# Patient Record
Sex: MALE | Race: WHITE | NOT HISPANIC OR LATINO | Employment: OTHER | URBAN - METROPOLITAN AREA
[De-identification: names, ages, dates, MRNs, and addresses within clinical notes are randomized per-mention and may not be internally consistent; named-entity substitution may affect disease eponyms.]

---

## 2022-02-23 ENCOUNTER — TELEPHONE (OUTPATIENT)
Dept: ALLERGY | Facility: CLINIC | Age: 67
End: 2022-02-23
Payer: MEDICAID

## 2022-02-23 NOTE — TELEPHONE ENCOUNTER
----- Message from Belkis Herbert sent at 2/23/2022  4:46 PM CST -----  Regarding: pt called  Name of Who is Calling: ANGELES LEYVA [[id number]]      What is the request in detail: pt is requesting a epipen refill , and would like an over the counter eye recommendation from the Dr . Please advise     ([facility]  [address], [facility] [address]  [phone])      Can the clinic reply by [facility]: No      What Number to Call Back if not in [facility]: [phone]

## 2022-02-23 NOTE — TELEPHONE ENCOUNTER
Please advise pt that he must be seen before we can prescribe anything. As far as recommendation for eye drop, he may use OTC Pataday one drop twice daily.

## 2022-02-23 NOTE — TELEPHONE ENCOUNTER
Instructions conveyed to pt. He states he will contact pcp for Epipen and if they will not write it for him, he will call back and schedule an appt.

## 2023-01-23 ENCOUNTER — TELEPHONE (OUTPATIENT)
Dept: ALLERGY | Facility: CLINIC | Age: 68
End: 2023-01-23
Payer: MEDICARE

## 2023-01-23 NOTE — TELEPHONE ENCOUNTER
----- Message from Marianne Wheeler sent at 1/23/2023 10:42 AM CST -----  Contact: Cole Barr is calling in regards to getting his appointment on 1/25 move to a different day . Please call him back a [phone]      Thanks  CF

## 2023-03-02 ENCOUNTER — TELEPHONE (OUTPATIENT)
Dept: ALLERGY | Facility: CLINIC | Age: 68
End: 2023-03-02
Payer: MEDICARE

## 2023-03-02 NOTE — TELEPHONE ENCOUNTER
Left message for pt to return our call regarding rescheduling appt on May 18th. Appt has been cancelled.

## 2023-09-14 ENCOUNTER — OFFICE VISIT (OUTPATIENT)
Dept: ALLERGY | Facility: CLINIC | Age: 68
End: 2023-09-14
Payer: MEDICARE

## 2023-09-14 VITALS
WEIGHT: 156.06 LBS | TEMPERATURE: 98 F | HEART RATE: 64 BPM | SYSTOLIC BLOOD PRESSURE: 175 MMHG | DIASTOLIC BLOOD PRESSURE: 91 MMHG

## 2023-09-14 DIAGNOSIS — G47.33 OSA (OBSTRUCTIVE SLEEP APNEA): ICD-10-CM

## 2023-09-14 DIAGNOSIS — H10.10 SEASONAL ALLERGIC CONJUNCTIVITIS: ICD-10-CM

## 2023-09-14 DIAGNOSIS — R09.81 NASAL CONGESTION: ICD-10-CM

## 2023-09-14 DIAGNOSIS — L29.9 GENERALIZED PRURITUS: ICD-10-CM

## 2023-09-14 DIAGNOSIS — J30.2 PERENNIAL ALLERGIC RHINITIS WITH SEASONAL VARIATION: Primary | ICD-10-CM

## 2023-09-14 DIAGNOSIS — Z86.69 HISTORY OF SLEEP APNEA: ICD-10-CM

## 2023-09-14 DIAGNOSIS — R53.83 MALAISE AND FATIGUE: ICD-10-CM

## 2023-09-14 DIAGNOSIS — R09.82 PND (POST-NASAL DRIP): ICD-10-CM

## 2023-09-14 DIAGNOSIS — J30.89 PERENNIAL ALLERGIC RHINITIS WITH SEASONAL VARIATION: Primary | ICD-10-CM

## 2023-09-14 DIAGNOSIS — R53.81 MALAISE AND FATIGUE: ICD-10-CM

## 2023-09-14 DIAGNOSIS — L29.9 ITCHING: ICD-10-CM

## 2023-09-14 DIAGNOSIS — R06.83 SNORING: ICD-10-CM

## 2023-09-14 PROCEDURE — 99213 OFFICE O/P EST LOW 20 MIN: CPT | Mod: PBBFAC | Performed by: SPECIALIST

## 2023-09-14 PROCEDURE — 1101F PR PT FALLS ASSESS DOC 0-1 FALLS W/OUT INJ PAST YR: ICD-10-PCS | Mod: CPTII,S$GLB,, | Performed by: SPECIALIST

## 2023-09-14 PROCEDURE — 99205 OFFICE O/P NEW HI 60 MIN: CPT | Mod: S$GLB,,, | Performed by: SPECIALIST

## 2023-09-14 PROCEDURE — 99999 PR PBB SHADOW E&M-EST. PATIENT-LVL III: CPT | Mod: PBBFAC,,, | Performed by: SPECIALIST

## 2023-09-14 PROCEDURE — 99205 PR OFFICE/OUTPT VISIT, NEW, LEVL V, 60-74 MIN: ICD-10-PCS | Mod: S$GLB,,, | Performed by: SPECIALIST

## 2023-09-14 PROCEDURE — 1126F PR PAIN SEVERITY QUANTIFIED, NO PAIN PRESENT: ICD-10-PCS | Mod: CPTII,S$GLB,, | Performed by: SPECIALIST

## 2023-09-14 PROCEDURE — 3288F PR FALLS RISK ASSESSMENT DOCUMENTED: ICD-10-PCS | Mod: CPTII,S$GLB,, | Performed by: SPECIALIST

## 2023-09-14 PROCEDURE — 99999 PR PBB SHADOW E&M-EST. PATIENT-LVL III: ICD-10-PCS | Mod: PBBFAC,,, | Performed by: SPECIALIST

## 2023-09-14 PROCEDURE — 3288F FALL RISK ASSESSMENT DOCD: CPT | Mod: CPTII,S$GLB,, | Performed by: SPECIALIST

## 2023-09-14 PROCEDURE — 3080F PR MOST RECENT DIASTOLIC BLOOD PRESSURE >= 90 MM HG: ICD-10-PCS | Mod: CPTII,S$GLB,, | Performed by: SPECIALIST

## 2023-09-14 PROCEDURE — 1126F AMNT PAIN NOTED NONE PRSNT: CPT | Mod: CPTII,S$GLB,, | Performed by: SPECIALIST

## 2023-09-14 PROCEDURE — 3077F PR MOST RECENT SYSTOLIC BLOOD PRESSURE >= 140 MM HG: ICD-10-PCS | Mod: CPTII,S$GLB,, | Performed by: SPECIALIST

## 2023-09-14 PROCEDURE — 1159F MED LIST DOCD IN RCRD: CPT | Mod: CPTII,S$GLB,, | Performed by: SPECIALIST

## 2023-09-14 PROCEDURE — 1159F PR MEDICATION LIST DOCUMENTED IN MEDICAL RECORD: ICD-10-PCS | Mod: CPTII,S$GLB,, | Performed by: SPECIALIST

## 2023-09-14 PROCEDURE — 3080F DIAST BP >= 90 MM HG: CPT | Mod: CPTII,S$GLB,, | Performed by: SPECIALIST

## 2023-09-14 PROCEDURE — 3077F SYST BP >= 140 MM HG: CPT | Mod: CPTII,S$GLB,, | Performed by: SPECIALIST

## 2023-09-14 PROCEDURE — 1101F PT FALLS ASSESS-DOCD LE1/YR: CPT | Mod: CPTII,S$GLB,, | Performed by: SPECIALIST

## 2023-09-14 RX ORDER — PANTOPRAZOLE SODIUM 40 MG/1
40 TABLET, DELAYED RELEASE ORAL DAILY
COMMUNITY

## 2023-09-14 RX ORDER — AMITRIPTYLINE HYDROCHLORIDE 10 MG/1
TABLET, FILM COATED ORAL
Qty: 100 TABLET | Refills: 6 | Status: SHIPPED | OUTPATIENT
Start: 2023-09-14

## 2023-09-14 RX ORDER — ALLOPURINOL 100 MG/1
100 TABLET ORAL DAILY
COMMUNITY

## 2023-09-14 NOTE — PROGRESS NOTES
"Subjective:       Patient ID: Cortney Daniel is a 67 y.o. male.    Chief Complaint:  Allergies (Very allergic pt. Used to be on shots at our old office. Does not wants to be on AIT anymore. Stays nasally congested. Does not sleep well. Had sleep apnea years ago. Never on CPAP. Snores terribly. Evaluation for this done approx 20 years or so ago./Denies any infections.) and Itching (Generalized itching x 3 months or so. Does not take antihistamines because of hyperactivity./)      HPI:  Follow up.   Ne complaint : In the last 3 months having generalized pruritis.     male 67 year old, was a patient at the [facility]-- WITH ALLERGIES TO NON POLLENS AND POLLENS BY PREVIOUS TESTING AT THE [facility]-- HE SUCCESSFULLY UNDERWENT AIT / SCIT  for sporadically between 2002  and 2005 and more regularly for 2 plus years-- 2013- 2015-- for cat , dog and pollens of weeds , grasses and trees.    He was allergy skin tested on multiple occasions-- 02- 19- 2002 and 05- 19- 1998.  He was extremely allergic to all pollens- all seasons-- weeds, grasses and tree pollens, molds, House Dust Mites and cat and dog allergens  He has 4 indoor cats.  No longer on SCIT. He is well versed with allergen control measures. Allergy re skin testing was not recommended. " All antihistamines- even sedating ones-- HYPES him up ". Hence a steroid nose spray will; be a good choice.    Has Xerophthalmia.    Has eczema of several years- in remission. His skin is dry. Has seasonal eye allergies.  Cortney had had dysphagia and symptoms of Eosinophilic Esophagitis-- now under control..    Has a history suggestive of allergy to Penicillin--may do skin testing with Pre Pen and Penicillin -G and orally challenge with Augmentin.  He reports that in the past Beniacr affected / injured his kidney.  Is AN [employer] YE[employer].  Has a family history of allergies.    Outpatient Medications Marked as Taking for the 9/14/23 encounter (Office Visit) with Jose Luis Poe MD "   Medication Sig Dispense Refill    allopurinoL (ZYLOPRIM) 100 MG tablet Take 100 mg by mouth once daily.      pantoprazole (PROTONIX) 40 MG tablet Take 40 mg by mouth once daily.          Pcn [penicillins] and Benicar [olmesartan]     History reviewed. No pertinent past medical history.    History reviewed. No pertinent family history.    Environmental History: Dust Mite Controls: Dust mite controls are already in place.     Review of Systems   Constitutional:  Positive for fatigue.   HENT:  Positive for congestion, postnasal drip and rhinorrhea.    Eyes: Negative.    Respiratory: Negative.     Cardiovascular: Negative.    Gastrointestinal: Negative.    Endocrine: Negative.    Genitourinary: Negative.    Musculoskeletal: Negative.    Skin: Negative.         GENERALIZED ITCH     Allergic/Immunologic: Positive for environmental allergies.   Neurological: Negative.    Hematological: Negative.    Psychiatric/Behavioral: Negative.       Objective:     Visit Vitals  BP (!) 175/91 (BP Location: Left arm, Patient Position: Sitting)   Pulse 64   Temp 98.1 °F (36.7 °C)   Wt 70.8 kg (156 lb 1.4 oz)       Physical Exam  Vitals and nursing note reviewed.   Constitutional:       Appearance: Normal appearance. He is normal weight.   HENT:      Head: Normocephalic and atraumatic.      Right Ear: Tympanic membrane, ear canal and external ear normal.      Left Ear: Tympanic membrane, ear canal and external ear normal.      Nose: Congestion and rhinorrhea present.      Mouth/Throat:      Mouth: Mucous membranes are moist.      Pharynx: Oropharynx is clear.   Eyes:      Extraocular Movements: Extraocular movements intact.      Conjunctiva/sclera: Conjunctivae normal.      Pupils: Pupils are equal, round, and reactive to light.   Cardiovascular:      Rate and Rhythm: Normal rate and regular rhythm.      Pulses: Normal pulses.      Heart sounds: Normal heart sounds.   Pulmonary:      Effort: Pulmonary effort is normal.      Breath  "sounds: Normal breath sounds.   Abdominal:      General: Abdomen is flat. Bowel sounds are normal.      Palpations: Abdomen is soft.   Musculoskeletal:         General: Normal range of motion.      Cervical back: Normal range of motion and neck supple.   Skin:     General: Skin is warm and dry.      Capillary Refill: Capillary refill takes less than 2 seconds.   Neurological:      General: No focal deficit present.      Mental Status: He is alert and oriented to person, place, and time. Mental status is at baseline.   Psychiatric:         Mood and Affect: Mood normal.         Behavior: Behavior normal.         Thought Content: Thought content normal.         Judgment: Judgment normal.       Assessment:      1. Perennial allergic rhinitis with seasonal variation    2. Snoring    3. History of sleep apnea    4. Itching    5. Generalized pruritus    6. Nasal congestion    7. Seasonal allergic conjunctivitis    8. BRADY (obstructive sleep apnea)    9. PND (post-nasal drip)    10. Malaise and fatigue    11    All antihistamines hypes him up.     12    Ex cigarette smoker-- Smoked 20 cigarettes per day for 20 years- Quit smoking 33 years ago  13    Eczema- in remission    Plan:     Has 4 indoor cats- always had.  Lukewarm showers or bath.  Coconut oil before and after showers-- and Cere Ve or Aquaphor - bid/ tid.  Start on Elavil 10- 50 mg at 6 PM. Start with 10 mg at 6 PM- advance dose at 10 mg increments q 7 days, as toleraterd, as needed.  " USING HUMIDIFIER ".  Partaday -- one drop bid-- and Refresh Tears-- 2 drops tid prn.  Vanicream or Cere VE bid.  SLEEP CLINIC CONSULT.-- Refer to Nico Bajwa MD- will need to do another polysomnogram and get back on CPAP- IF NEEDED,.  Annual influenza vaccinations  For allergies-- may apply Flonase 50 mcg- -- 2 puffs bid-- since Allegra, Zyrtec and Claritin hyped him up in the past.  Follow up in 6 months                  Problems Address                                        "          Amount and/or Complexity                                                                      Risk       3           [] 2 or more self-limited or minor problems                      [] Limited                                                                        [] Low                  [] 1 stable chronic illness                                                  Any combination of the two                                               OTC drugs                  []Acute, uncomplicated illness or injury                            Review of prior external notes from unique source           Minor surgery with no risk factors                                                                                                               [] 1 []2  []3+                                                                                                              Review of results from each unique test                                                                                                               [] 1 []2  [] 3+                                                                                                              Order of each unique test                                                                                                               [] 1 []2  [] 3+                                                                                                              Or                                                                                                             [] Assessment requiring an independent historian      4            [] One or more chronic illness with exacerbation,              [] Moderate                                                                      [] Moderate                 Progression, or side effects of treatment                            -test documents or independent historians                        Prescription drug management                []  2 or more  sable chronic illnesses                                    [] Independent interpretation of tests                              Minor surgery with identifiable risk                [] 1 undiagnosed new problem with uncertain prognosis    [] Discussion or management of test results                    elective major surgery                [] 1 acute illness with                systemic symptoms                                                                                                                                                              [] 1 acute complicated injury                                                                                                                                          Elective major surgery                                                                                                                                                                                                                                                                                                                                                                                                  5            [] 1 or more chronic illnesses with severe exacerbation,     [] Extensive(two from below)                                         [] High                                                                                                               [] Independent interpretation of results                         Drug therapy requiring intensive                                                                                                               []Discussion of management or test interpretation           monitoring                                                                                                                                                                                                       Decision to de-escalate care                 [] 1 acute or chronic illness or injury  that poses a threat                                                                                               Decision regarding hospitalization

## 2024-01-04 NOTE — PROGRESS NOTES
Pulmonary Outpatient  Visit     Subjective:       Patient ID: Cortney Daniel is a 68 y.o. male.    Social History     Tobacco Use   Smoking Status Never   Smokeless Tobacco Never            Chief Complaint: Apnea      Cortnye Daniel is 68 y.o.  Referred by Jose Luis Poe MD  [address] [facility] [place] [address]   Concern for BRADY  Had PSG many years ago was +ve for BRADY  Never followup  Snoring, cannot share bedroom with spouse  Apnea,   Difficulty falling asleep  Never smoker  Declined RSV, Flu, PCV  20  Had coloscopy at [facility]  Colon cancer Mother  and 3 uncles  Retired worked for school board  Bed time 9 pm, wake time 6 am  SOL 30 minutes    Legs feel odd and restless  No signs supporting narcolepsy  Move violently during sleep  Naps for 30 mins  Endorsed loud snoring, nocturnal diaphoresis, apnea, dry mouth              Review of Systems   Constitutional:  Positive for fatigue.   Respiratory:  Positive for apnea and snoring.    Psychiatric/Behavioral:  Positive for sleep disturbance.    All other systems reviewed and are negative.      Outpatient Encounter Medications as of 1/5/2024   Medication Sig Dispense Refill    allopurinoL (ZYLOPRIM) 100 MG tablet Take 100 mg by mouth once daily.      amitriptyline (ELAVIL) 10 MG tablet Take 2 to 5 tablets at bedtime (titrate the dose) 100 tablet 6    pantoprazole (PROTONIX) 40 MG tablet Take 40 mg by mouth once daily.       No facility-administered encounter medications on file as of 1/5/2024.       The following portions of the patient's history were reviewed and updated as appropriate: He  has no past medical history on file.  He does not have any pertinent problems on file.  He  has no past surgical history on file.  His family history is not on file.  He  reports that he has never smoked. He has never used smokeless tobacco. No history on file for alcohol use and drug  use.  He has a current medication list which includes the following prescription(s): allopurinol, amitriptyline, and pantoprazole.  Current Outpatient Medications on File Prior to Visit   Medication Sig Dispense Refill    allopurinoL (ZYLOPRIM) 100 MG tablet Take 100 mg by mouth once daily.      amitriptyline (ELAVIL) 10 MG tablet Take 2 to 5 tablets at bedtime (titrate the dose) 100 tablet 6    pantoprazole (PROTONIX) 40 MG tablet Take 40 mg by mouth once daily.       No current facility-administered medications on file prior to visit.     He is allergic to pcn [penicillins] and benicar [olmesartan]..      BP Readings from Last 3 Encounters:   01/05/24 130/80   09/14/23 (!) 175/91     Snoring / Sleep:     [place] Questionnaire (validated BRADY screening questionnaire)    YES -- Snoring/apnea    YES -- Fatigue    Body mass index is 25.24 kg/m².  (>25 is overweight, >30 is obese)    Blood Pressure = normal blood pressure  (PreHTN 120-139/80-89, Stg1 140-159/90-99, Stg2 >160/>100)  [place] = 2 of three BRADY categories are positive (high risk is 2-3 positive categories)     [place] Sleepiness Scale TOTAL =          1/5/2024     9:16 AM   EPWORTH SLEEPINESS SCALE   Sitting and reading 0   Watching TV 0   Sitting, inactive in a public place (e.g. a theatre or a meeting) 0   As a passenger in a car for an hour without a break 0   Lying down to rest in the afternoon when circumstances permit 0   Sitting and talking to someone 0   Sitting quietly after a lunch without alcohol 0   In a car, while stopped for a few minutes in traffic 0   Total score 0      (validated sleepiness questionnaire with a higher score indicating greater sleepiness; range 0-24)      STOP-Bang Questionnaire (validated BRADY screening questionnaire)  Negative unless checked off.  [x] Snoring    [x]  Tired/Fatigued/Sleepy  [x] Obstruction (apneas/choking)  [] Pressure (HTN)  [] BMI >35  [x] Age >50  [] Neck >40 cm  [x] Gender male   STOP-Bang = 5 (low risk  "0-2,high risk 3-8)    Neck circumference 14"       MMRC Dyspnea Scale (4 is worst)     [] MMRC 0: Dyspneic on strenuous excercise (0 points)    [] MMRC 1: Dyspneic on walking a slight hill (0 points)    [] MMRC 2: Dyspneic on walking level ground; must stop occasionally due to breathlessness (1 point)    [] MMRC 3: Must stop for breathlessness after walking 100 yards or after a few minutes (2 points)    [] MMRC 4: Cannot leave house; breathless on dressing/undressing (3 points)                          No data to display                          Objective:     Vital Signs (Most Recent)  Vital Signs  Pulse: 75  Resp: 17  SpO2: 96 %  BP: 130/80  Height and Weight  Height: 5' 7" (170.2 cm)  Weight: 73.1 kg (161 lb 2.5 oz)  BSA (Calculated - sq m): 1.86 sq meters  BMI (Calculated): 25.2  Weight in (lb) to have BMI = 25: 159.3]  Wt Readings from Last 2 Encounters:   01/05/24 73.1 kg (161 lb 2.5 oz)   09/14/23 70.8 kg (156 lb 1.4 oz)       Physical Exam  Vitals and nursing note reviewed.   Constitutional:       Appearance: Normal appearance.   HENT:      Head: Normocephalic and atraumatic.      Right Ear: Tympanic membrane normal.      Nose: Nose normal.   Eyes:      Pupils: Pupils are equal, round, and reactive to light.   Cardiovascular:      Rate and Rhythm: Normal rate and regular rhythm.      Pulses: Normal pulses.      Heart sounds: Normal heart sounds.   Pulmonary:      Effort: Pulmonary effort is normal.      Breath sounds: Normal breath sounds.   Abdominal:      General: Bowel sounds are normal.      Palpations: Abdomen is soft.   Musculoskeletal:         General: Normal range of motion.      Cervical back: Normal range of motion.   Skin:     General: Skin is warm and dry.      Capillary Refill: Capillary refill takes less than 2 seconds.   Neurological:      General: No focal deficit present.      Mental Status: He is alert and oriented to person, place, and time.   Psychiatric:         Mood and Affect: Mood " "normal.          Laboratory  No results found for: "WBC", "RBC", "HGB", "HCT", "MCV", "MCH", "MCHC", "RDW", "PLT", "MPV", "GRAN", "LYMPH", "MONO", "EOS", "BASO", "EOSINOPHIL", "BASOPHIL"    BMP  Lab Results   Component Value Date     03/25/2021    K 4.2 03/25/2021     03/25/2021    CO2 24 03/25/2021    BUN 14 03/25/2021    CREATININE 0.82 03/25/2021    CALCIUM 9.4 03/25/2021    ANIONGAP 12 03/25/2021    ESTGFRAFRICA 114 03/25/2021          No results found for: "IGE"     No results found for: "ASPERGILLUS"  No results found for: "AFUMIGATUSCL"     No results found for: "ACE"     Diagnostic Results:  I have personally reviewed today the following studies:    No image results found.       Assessment/Plan:     Problem List Items Addressed This Visit       Snoring    History of sleep apnea    Perennial allergic rhinitis with seasonal variation    BRADY (obstructive sleep apnea) - Primary     Will order CPAP after PSG         Relevant Orders    Polysomnogram (CPAP will be added if patient meets diagnostic criteria.)    X-Ray Chest PA And Lateral             Follow up in about 6 weeks (around 2/16/2024), or PSG, CXR. declined vacinations.    This note was prepared using voice recognition system and is likely to have sound alike errors that may have been overlooked even after proof reading.  Please call me with any questions    Discussed diagnosis, its evaluation, treatment and usual course. All questions answered.    Thank you for the courtesy of participating in the care of this patient    Nico Bajwa MD      Personal Diagnostic Review  []  CXR    []  ECHO    []  ONSAT    []  6MWD    []  LABS    []  CHEST CT    []  PET CT    []  Biopsy results           "

## 2024-01-05 ENCOUNTER — HOSPITAL ENCOUNTER (OUTPATIENT)
Dept: RADIOLOGY | Facility: HOSPITAL | Age: 69
Discharge: HOME | End: 2024-01-05
Attending: INTERNAL MEDICINE
Payer: MEDICARE

## 2024-01-05 ENCOUNTER — OFFICE VISIT (OUTPATIENT)
Dept: PULMONOLOGY | Facility: CLINIC | Age: 69
End: 2024-01-05
Payer: MEDICARE

## 2024-01-05 VITALS
SYSTOLIC BLOOD PRESSURE: 130 MMHG | WEIGHT: 161.19 LBS | RESPIRATION RATE: 17 BRPM | HEIGHT: 67 IN | BODY MASS INDEX: 25.3 KG/M2 | HEART RATE: 75 BPM | DIASTOLIC BLOOD PRESSURE: 80 MMHG | OXYGEN SATURATION: 96 %

## 2024-01-05 DIAGNOSIS — J30.89 PERENNIAL ALLERGIC RHINITIS WITH SEASONAL VARIATION: ICD-10-CM

## 2024-01-05 DIAGNOSIS — G47.33 OSA (OBSTRUCTIVE SLEEP APNEA): ICD-10-CM

## 2024-01-05 DIAGNOSIS — Z86.69 HISTORY OF SLEEP APNEA: ICD-10-CM

## 2024-01-05 DIAGNOSIS — R06.83 SNORING: ICD-10-CM

## 2024-01-05 DIAGNOSIS — J30.2 PERENNIAL ALLERGIC RHINITIS WITH SEASONAL VARIATION: ICD-10-CM

## 2024-01-05 DIAGNOSIS — G47.33 OSA (OBSTRUCTIVE SLEEP APNEA): Primary | ICD-10-CM

## 2024-01-05 PROCEDURE — 99999 PR PBB SHADOW E&M-EST. PATIENT-LVL V: CPT | Mod: PBBFAC,,, | Performed by: INTERNAL MEDICINE

## 2024-01-05 PROCEDURE — 71046 X-RAY EXAM CHEST 2 VIEWS: CPT | Mod: TC

## 2024-01-05 PROCEDURE — 99204 OFFICE O/P NEW MOD 45 MIN: CPT | Mod: S$GLB,,, | Performed by: INTERNAL MEDICINE

## 2024-01-05 PROCEDURE — 71046 X-RAY EXAM CHEST 2 VIEWS: CPT | Mod: 26,,, | Performed by: RADIOLOGY

## 2024-01-05 NOTE — PATIENT INSTRUCTIONS
Your provider has scheduled you for a sleep study.   You should be receiving a phone call from the sleep lab shortly after your study has been approved by your insurance. Please make sure you have your current phone numbers in the [facility] system.     If you do not hear from anyone in the next 10 business days, please call the sleep lab at [phone] to schedule your sleep study.     The sleep studies are performed at [facility] seven nights a week.        After completion of sleep study, the pulmonary /Sleep navigator coordinator  will  make you a follow up appointment with your sleep provider after they have reviewed the results     This follow up appointment will be 10-14 days after your sleep study to review the results. If it is noted that you do have sleep apnea on your initial sleep study, you may receive a call back for a second night study with the CPAP before you come back to the office.     Followup appointments for results review would be best done VIRTUAL however inperson can also be done

## 2024-01-05 NOTE — LETTER
January 5, 2024        Chalino Lei MD  [address]  [facility] [address]             The [address]  [address]  [place] [address]  Phone: [phone]  Fax: [phone]   Patient: Cortney Daniel   MR Number: [id number]   YOB: 1955   Date of Visit: 1/5/2024       Dear Dr. Lei:    Thank you for referring Cortney Daniel to me for evaluation. Attached you will find relevant portions of my assessment and plan of care.    If you have questions, please do not hesitate to call me. I look forward to following Cortney Daniel along with you.    Sincerely,      Nico Bajwa MD            CC    No Recipients    Enclosure

## 2024-01-14 ENCOUNTER — HOSPITAL ENCOUNTER (OUTPATIENT)
Dept: SLEEP MEDICINE | Facility: HOSPITAL | Age: 69
Discharge: HOME | End: 2024-01-14
Attending: INTERNAL MEDICINE
Payer: MEDICARE

## 2024-01-14 DIAGNOSIS — R06.83 PRIMARY SNORING: ICD-10-CM

## 2024-01-14 DIAGNOSIS — F51.02 ADJUSTMENT INSOMNIA: ICD-10-CM

## 2024-01-14 DIAGNOSIS — G47.61 PLMD (PERIODIC LIMB MOVEMENT DISORDER): Primary | ICD-10-CM

## 2024-01-14 DIAGNOSIS — G47.33 OSA (OBSTRUCTIVE SLEEP APNEA): ICD-10-CM

## 2024-01-14 PROCEDURE — 95810 POLYSOM 6/> YRS 4/> PARAM: CPT

## 2024-01-14 PROCEDURE — 95810 POLYSOM 6/> YRS 4/> PARAM: CPT | Mod: 26,,, | Performed by: INTERNAL MEDICINE

## 2024-01-26 NOTE — PROCEDURES
"Patient Name: Cortney Daniel Study Date: 1/14/2024   YOB: 1955 Study Type: PSG   Age: 68 year Patient #: [id number]   Sex: Male Billing ID: -   Height: 5' 7" Interpreting Physician: Nico Bajwa MD   Weight: 161.0 lbs Recording Tech: Timbo Leong   BMI: 25.3 Scoring Tech: Angelitagianfranco Pinzon   Draper: 0 Neck Circumference: 14     Indications for Polysomnography  The patient is a 68 year-old Male who is 5' 7" and weighs 161.0 lbs. His BMI equals 25.3.  A full night polysomnogram was performed to evaluate for -.BRADY    Referring Provider Nico Bajwa MD    Medical History: Concern for BRADY  Had PSG many years ago was +ve for BRADY  Never follow-up  Snoring, cannot share bedroom with spouse  Apnea,   Difficulty falling asleep  Bed time 9 pm, wake time 6 am  SOL 30 minutes     Legs feel odd and restless  No signs supporting narcolepsy  Move violently during sleep  Naps for 30 mins  Endorsed loud snoring, nocturnal diaphoresis, apnea, dry mouth      Medication   allopurinoL (ZYLOPRIM) 100 MG tablet    amitriptyline (ELAVIL) 10 MG tablet    pantoprazole (PROTONIX) 40 MG tablet      Test Description  Patient was connected to a full set of leads with a sleep technician in attendance.  Parameters used were EEG derivations (F4-A1, C4-A1, O2-A1), EOG derivations (LOC-A2, DRE-A2), EKG, EMG - extremity (leg) & chin, oxygen saturation, effort parameters + abdominal/thoracic (RIP), and airflow parameters - nasal pressure/thermal.  Body position was visually monitored and noted.  Audio & video monitoring was performed during study.    Scoring is done in accordance with the American Academy of Sleep Medicine Manual for the Scoring of Sleep and Associated Events version 2.6.  Hypopneas are scored using the 4% desaturation rule.    Sleep Architecture  The total recording time of the polysomnogram was 484.1 minutes.  The total sleep time was 288.0 minutes.  The patient spent 17.7% of total sleep time in Stage N1, 55.4% " in Stage N2, 6.9% in Stages N3, and 20.0% in REM.  Sleep latency was 121.6 minutes.  REM latency was 55.5 minutes.  Sleep Efficiency was 59.5%.  Wake after Sleep Onset time was 74.5 minutes.    Respiratory Events  The polysomnogram revealed a presence of 4 obstructive, - central, and 1 mixed apneas resulting in an Apnea index of 1.0 events per hour.  There were 5 hypopneas (?3% desat and/or Ar.) resulting in an Apnea\Hypopnea Index (AHI ?3% desat and/or Ar.) of 2.1 events per hour.  There were 4 hypopneas (?4% desat) resulting in an Apnea\Hypopnea Index (AHI ?4% desat) of 1.9 events per hour.  There were - Respiratory Effort Related Arousals resulting in a RERA index of - events per hour. The Respiratory Disturbance Index is 2.1 events per hour.     Mean oxygen saturation was 93.4%.  The lowest oxygen saturation during sleep was 87.0%.  Time spent ?88% oxygen saturation was 1.3 minutes (0.3%).    Limb Activity  There were 210 total limb movements recorded, of this total, 209 were classified as PLMs.  PLM index was 43.5 per hour and PLM associated with Arousals index was 9.4 per hour.    Cardiac Summary  The average pulse rate was 63.6 bpm.  The minimum pulse rate was 52.0 bpm while the maximum pulse rate was 95.0 bpm.  Cardiac rhythm was normal/PVC    Behavioralobservation  awake 21:01 hrs to 22:58 hrs, 23:21 hrs to 23:34 hrs      Conclusion:   PLM index: 43.5/hr with PLMAI 9.4/hr  Arousal index was High: 27.3/hr  Overall AHI 2.1/hr with 10 events.  Moderate snoring  Poor sleep efficiency: 59.5%    Diagnosis: Periodic Limb Movement Disorder / 327.51   Primary Snoring   Adjustment insomnia    Recommendations:     ENT evaluation for snoring  Sleep diary and insomnia therapy  Clinical correlation for restless legs      Nico Woo MD  [address] [facility]  [place] [id number]/Chalino Lei MD         The sleep study that you ordered is complete.  You have ordered sleep LAB services to perform the  "sleep study for Cortney Daniel .      Please find Sleep Study result in  the "Media tab" of Chart Review menu.        You can look  for the report in the  Media by the document type "Sleep Study Documents". Alphabetizing  "Document type" column helps to find the SLEEP STUDY report  Faster.       As the ordering provider, you are responsible for reviewing the results and implementing a treatment plan with your patient.    If you need a Sleep Medicine provider to explain the sleep study findings and arrange treatment for the patient, please refer patient for consultation to our Sleep Clinic via [facility] with Ambulatory Consult Sleep.     To do that please place an order for an  "Ambulatory Consult Sleep" -  order , it will go to our clinic work queue for our staff  to contact the patient for an appointment.      For any questions, please contact our sleep lab  staff at [phone] to talk to clinical staff          Nico Bajwa MD    "

## 2024-02-15 ENCOUNTER — OFFICE VISIT (OUTPATIENT)
Dept: SLEEP MEDICINE | Facility: CLINIC | Age: 69
End: 2024-02-15
Payer: MEDICARE

## 2024-02-15 VITALS
BODY MASS INDEX: 25.12 KG/M2 | RESPIRATION RATE: 18 BRPM | HEIGHT: 67 IN | DIASTOLIC BLOOD PRESSURE: 80 MMHG | SYSTOLIC BLOOD PRESSURE: 136 MMHG | OXYGEN SATURATION: 97 % | WEIGHT: 160.06 LBS | HEART RATE: 80 BPM

## 2024-02-15 DIAGNOSIS — G47.61 PLMD (PERIODIC LIMB MOVEMENT DISORDER): Primary | ICD-10-CM

## 2024-02-15 DIAGNOSIS — E78.2 MIXED HYPERLIPIDEMIA: ICD-10-CM

## 2024-02-15 DIAGNOSIS — R06.83 SNORING: ICD-10-CM

## 2024-02-15 PROBLEM — G47.33 OSA (OBSTRUCTIVE SLEEP APNEA): Status: RESOLVED | Noted: 2023-09-14 | Resolved: 2024-02-15

## 2024-02-15 PROBLEM — E78.5 HYPERLIPIDEMIA: Status: ACTIVE | Noted: 2024-02-15

## 2024-02-15 PROCEDURE — 99999 PR PBB SHADOW E&M-EST. PATIENT-LVL IV: CPT | Mod: PBBFAC,,, | Performed by: INTERNAL MEDICINE

## 2024-02-15 PROCEDURE — 99214 OFFICE O/P EST MOD 30 MIN: CPT | Mod: S$GLB,,, | Performed by: INTERNAL MEDICINE

## 2024-02-15 RX ORDER — PRAMIPEXOLE DIHYDROCHLORIDE 0.12 MG/1
0.25 TABLET ORAL NIGHTLY
Qty: 60 TABLET | Refills: 1 | Status: SHIPPED | OUTPATIENT
Start: 2024-02-15 | End: 2024-04-15

## 2024-02-15 NOTE — PROGRESS NOTES
Pulmonary Outpatient  Visit     Subjective:       Patient ID: Cortney Daniel is a 68 y.o. male.    Social History     Tobacco Use   Smoking Status Never   Smokeless Tobacco Never            Chief Complaint: Results      Cortney Daniel is 68 y.o.  Referred by Jose Luis Poe MD  [address] [facility] [place] [facility] [place] [address]   Concern for BRADY  Had PSG many years ago was +ve for BRADY  Never followup  Snoring, cannot share bedroom with spouse  Apnea,   Difficulty falling asleep  Never smoker  Declined RSV, Flu, PCV  20  Had coloscopy at [facility]  Colon cancer Mother  and 3 uncles  Retired worked for school [employer]  Bed time 9 pm, wake time 6 am  SOL 30 minutes    Legs feel odd and restless  No signs supporting narcolepsy  Move violently during sleep  Naps for 30 mins  Endorsed loud snoring, nocturnal diaphoresis, apnea, dry mouth        02/15/2024  Followup  Sleep study NO BRADY  Had Snoring  Nose deformed to right side  Chronic nasal congestion and nasal blockage Lt>> Rt  Will check Ferritin  Added Mirapex            Review of Systems   Constitutional:  Positive for fatigue.   Respiratory:  Positive for snoring. Negative for apnea.    Psychiatric/Behavioral:  Positive for sleep disturbance.    All other systems reviewed and are negative.      Outpatient Encounter Medications as of 2/15/2024   Medication Sig Dispense Refill    allopurinoL (ZYLOPRIM) 100 MG tablet Take 100 mg by mouth once daily.      amitriptyline (ELAVIL) 10 MG tablet Take 2 to 5 tablets at bedtime (titrate the dose) 100 tablet 6    pantoprazole (PROTONIX) 40 MG tablet Take 40 mg by mouth once daily.      pramipexole (MIRAPEX) 0.125 MG tablet Take 2 tablets (0.25 mg total) by mouth every evening. 60 tablet 1     No facility-administered encounter medications on file as of 2/15/2024.       The following portions of the patient's history were reviewed and updated as  appropriate: He  has no past medical history on file.  He does not have any pertinent problems on file.  He  has no past surgical history on file.  His family history is not on file.  He  reports that he has never smoked. He has never used smokeless tobacco. No history on file for alcohol use and drug use.  He has a current medication list which includes the following prescription(s): allopurinol, amitriptyline, pantoprazole, and pramipexole.  Current Outpatient Medications on File Prior to Visit   Medication Sig Dispense Refill    allopurinoL (ZYLOPRIM) 100 MG tablet Take 100 mg by mouth once daily.      amitriptyline (ELAVIL) 10 MG tablet Take 2 to 5 tablets at bedtime (titrate the dose) 100 tablet 6    pantoprazole (PROTONIX) 40 MG tablet Take 40 mg by mouth once daily.       No current facility-administered medications on file prior to visit.     He is allergic to pcn [penicillins] and benicar [olmesartan]..      BP Readings from Last 3 Encounters:   02/15/24 136/80   01/05/24 130/80   09/14/23 (!) 175/91           2/15/2024     1:12 PM   EPWORTH SLEEPINESS SCALE   Sitting and reading 1   Watching TV 1   Sitting, inactive in a public place (e.g. a theatre or a meeting) 1   As a passenger in a car for an hour without a break 1   Lying down to rest in the afternoon when circumstances permit 1   Sitting and talking to someone 1   Sitting quietly after a lunch without alcohol 1   In a car, while stopped for a few minutes in traffic 1   Total score 8      MMRC Dyspnea Scale (4 is worst)     [] MMRC 0: Dyspneic on strenuous excercise (0 points)    [] MMRC 1: Dyspneic on walking a slight hill (0 points)    [] MMRC 2: Dyspneic on walking level ground; must stop occasionally due to breathlessness (1 point)    [] MMRC 3: Must stop for breathlessness after walking 100 yards or after a few minutes (2 points)    [] MMRC 4: Cannot leave house; breathless on dressing/undressing (3 points)                          No data to  "display                          Objective:     Vital Signs (Most Recent)  Vital Signs  Pulse: 80  Resp: 18  SpO2: 97 %  BP: 136/80  Height and Weight  Height: 5' 7" (170.2 cm)  Weight: 72.6 kg (160 lb 0.9 oz)  BSA (Calculated - sq m): 1.85 sq meters  BMI (Calculated): 25.1  Weight in (lb) to have BMI = 25: 159.3]  Wt Readings from Last 2 Encounters:   02/15/24 72.6 kg (160 lb 0.9 oz)   01/05/24 73.1 kg (161 lb 2.5 oz)       Physical Exam  Vitals and nursing note reviewed.   Constitutional:       Appearance: Normal appearance.   HENT:      Head: Normocephalic and atraumatic.      Right Ear: Tympanic membrane normal.      Nose: Nose normal.   Eyes:      Pupils: Pupils are equal, round, and reactive to light.   Cardiovascular:      Rate and Rhythm: Normal rate and regular rhythm.      Pulses: Normal pulses.      Heart sounds: Normal heart sounds.   Pulmonary:      Effort: Pulmonary effort is normal.      Breath sounds: Normal breath sounds.   Abdominal:      General: Bowel sounds are normal.      Palpations: Abdomen is soft.   Musculoskeletal:         General: Normal range of motion.      Cervical back: Normal range of motion.   Skin:     General: Skin is warm and dry.      Capillary Refill: Capillary refill takes less than 2 seconds.   Neurological:      General: No focal deficit present.      Mental Status: He is alert and oriented to person, place, and time.   Psychiatric:         Mood and Affect: Mood normal.        Laboratory  No results found for: "WBC", "RBC", "HGB", "HCT", "MCV", "MCH", "MCHC", "RDW", "PLT", "MPV", "GRAN", "LYMPH", "MONO", "EOS", "BASO", "EOSINOPHIL", "BASOPHIL"    BMP  Lab Results   Component Value Date     03/25/2021    K 4.2 03/25/2021     03/25/2021    CO2 24 03/25/2021    BUN 14 03/25/2021    CREATININE 0.82 03/25/2021    CALCIUM 9.4 03/25/2021    ANIONGAP 12 03/25/2021    ESTGFRAFRICA 114 03/25/2021          No results found for: "IGE"     No results found for: " ""ASPERGILLUS"  No results found for: "AFUMIGATUSCL"     No results found for: "ACE"     Diagnostic Results:  I have personally reviewed today the following studies:    X-Ray Chest PA And Lateral  Narrative: EXAMINATION:  XR CHEST PA AND LATERAL    CLINICAL HISTORY:  Obstructive sleep apnea (adult) (pediatric)    TECHNIQUE:  PA and lateral views of the chest were performed.    COMPARISON:  None    FINDINGS:  The lungs are clear, with normal appearance of pulmonary vasculature and no pleural effusion or pneumothorax.    The cardiac silhouette is normal in size. The hilar and mediastinal contours are unremarkable.    Bones are intact.  Impression: No acute abnormality.    Electronically signed by: Bogdan Umaña MD  Date:    01/05/2024  Time:    10:24     Patient Name: Cortney Daniel Study Date: 1/14/2024   YOB: 1955 Study Type: PSG   Age: 68 year Patient #: [id number]   Sex: Male Billing ID: -   Height: 5' 7" Interpreting Physician: Nico Bajwa MD   Weight: 161.0 lbs Recording Tech: Timbo Leong   BMI: 25.3 Scoring Tech: Angelita Pinzon   Refugio: 0 Neck Circumference: 14     Indications for Polysomnography  The patient is a 68 year-old Male who is 5' 7" and weighs 161.0 lbs. His BMI equals 25.3.  A full night polysomnogram was performed to evaluate for -.BRADY    Referring Provider Nico Bajwa MD    Medical History: Concern for BRADY  Had PSG many years ago was +ve for BRADY  Never follow-up  Snoring, cannot share bedroom with spouse  Apnea,   Difficulty falling asleep  Bed time 9 pm, wake time 6 am  SOL 30 minutes     Legs feel odd and restless  No signs supporting narcolepsy  Move violently during sleep  Naps for 30 mins  Endorsed loud snoring, nocturnal diaphoresis, apnea, dry mouth      Medication   allopurinoL (ZYLOPRIM) 100 MG tablet    amitriptyline (ELAVIL) 10 MG tablet    pantoprazole (PROTONIX) 40 MG tablet      Test Description  Patient was connected to a full set of leads with a sleep " technician in attendance.  Parameters used were EEG derivations (F4-A1, C4-A1, O2-A1), EOG derivations (LOC-A2, DRE-A2), EKG, EMG - extremity (leg) & chin, oxygen saturation, effort parameters + abdominal/thoracic (RIP), and airflow parameters - nasal pressure/thermal.  Body position was visually monitored and noted.  Audio & video monitoring was performed during study.    Scoring is done in accordance with the American Academy of Sleep Medicine Manual for the Scoring of Sleep and Associated Events version 2.6.  Hypopneas are scored using the 4% desaturation rule.    Sleep Architecture  The total recording time of the polysomnogram was 484.1 minutes.  The total sleep time was 288.0 minutes.  The patient spent 17.7% of total sleep time in Stage N1, 55.4% in Stage N2, 6.9% in Stages N3, and 20.0% in REM.  Sleep latency was 121.6 minutes.  REM latency was 55.5 minutes.  Sleep Efficiency was 59.5%.  Wake after Sleep Onset time was 74.5 minutes.    Respiratory Events  The polysomnogram revealed a presence of 4 obstructive, - central, and 1 mixed apneas resulting in an Apnea index of 1.0 events per hour.  There were 5 hypopneas (?3% desat and/or Ar.) resulting in an Apnea\Hypopnea Index (AHI ?3% desat and/or Ar.) of 2.1 events per hour.  There were 4 hypopneas (?4% desat) resulting in an Apnea\Hypopnea Index (AHI ?4% desat) of 1.9 events per hour.  There were - Respiratory Effort Related Arousals resulting in a RERA index of - events per hour. The Respiratory Disturbance Index is 2.1 events per hour.     Mean oxygen saturation was 93.4%.  The lowest oxygen saturation during sleep was 87.0%.  Time spent ?88% oxygen saturation was 1.3 minutes (0.3%).    Limb Activity  There were 210 total limb movements recorded, of this total, 209 were classified as PLMs.  PLM index was 43.5 per hour and PLM associated with Arousals index was 9.4 per hour.    Cardiac Summary  The average pulse rate was 63.6 bpm.  The minimum pulse rate was  52.0 bpm while the maximum pulse rate was 95.0 bpm.  Cardiac rhythm was normal/PVC    Behavioralobservation  awake 21:01 hrs to 22:58 hrs, 23:21 hrs to 23:34 hrs      Conclusion:   PLM index: 43.5/hr with PLMAI 9.4/hr  Arousal index was High: 27.3/hr  Overall AHI 2.1/hr with 10 events.  Moderate snoring  Poor sleep efficiency: 59.5%    Diagnosis: Periodic Limb Movement Disorder / 327.51   Primary Snoring   Adjustment insomnia    Recommendations:     ENT evaluation for snoring  Sleep diary and insomnia therapy  Clinical correlation for restless legs  Assessment/Plan:     Problem List Items Addressed This Visit       Snoring    Relevant Orders    Ambulatory referral/consult to ENT    Hyperlipidemia    PLMD (periodic limb movement disorder) - Primary     PLM index 43.5/hr  PLMAI was 9.4/hr           Relevant Medications    pramipexole (MIRAPEX) 0.125 MG tablet    Other Relevant Orders    FERRITIN     Other Visit Diagnoses       BRADY (obstructive sleep apnea)                      Follow up in about 3 months (around 5/15/2024), or start Mirapex, refer to ENT, lab.    This note was prepared using voice recognition system and is likely to have sound alike errors that may have been overlooked even after proof reading.  Please call me with any questions    Discussed diagnosis, its evaluation, treatment and usual course. All questions answered.    Thank you for the courtesy of participating in the care of this patient    Nico Bajwa MD      Personal Diagnostic Review  []  CXR    []  ECHO    []  ONSAT    []  6MWD    []  LABS    []  CHEST CT    []  PET CT    []  Biopsy results

## 2024-02-21 ENCOUNTER — TELEPHONE (OUTPATIENT)
Dept: OTOLARYNGOLOGY | Facility: CLINIC | Age: 69
End: 2024-02-21
Payer: MEDICARE

## 2024-02-21 NOTE — TELEPHONE ENCOUNTER
----- Message from Bina Bae PA-C sent at 2/21/2024  1:39 PM CST -----  Please call and reschedule patient to see MD instead of me.  Patient aware of this plan.  He's coming in for chronic nasal obstruction x 30 years.      Per Dr. Bajwa's note:  Sleep study NO BRADY  Had Snoring  Nose deformed to right side  Chronic nasal congestion and nasal blockage Lt>> Rt

## 2024-03-18 ENCOUNTER — OFFICE VISIT (OUTPATIENT)
Dept: OTOLARYNGOLOGY | Facility: CLINIC | Age: 69
End: 2024-03-18
Payer: MEDICARE

## 2024-03-18 VITALS — WEIGHT: 160.06 LBS | BODY MASS INDEX: 25.07 KG/M2

## 2024-03-18 DIAGNOSIS — J30.89 NON-SEASONAL ALLERGIC RHINITIS, UNSPECIFIED TRIGGER: Primary | ICD-10-CM

## 2024-03-18 DIAGNOSIS — K21.9 LARYNGOPHARYNGEAL REFLUX (LPR): ICD-10-CM

## 2024-03-18 DIAGNOSIS — R06.83 SNORING: ICD-10-CM

## 2024-03-18 DIAGNOSIS — J34.2 NASAL SEPTAL DEVIATION: ICD-10-CM

## 2024-03-18 DIAGNOSIS — G47.61 PLMD (PERIODIC LIMB MOVEMENT DISORDER): ICD-10-CM

## 2024-03-18 DIAGNOSIS — J34.3 HYPERTROPHY OF INFERIOR NASAL TURBINATE: ICD-10-CM

## 2024-03-18 PROCEDURE — 99999 PR PBB SHADOW E&M-EST. PATIENT-LVL III: CPT | Mod: PBBFAC,,, | Performed by: OTOLARYNGOLOGY

## 2024-03-18 PROCEDURE — 99204 OFFICE O/P NEW MOD 45 MIN: CPT | Mod: S$GLB,,, | Performed by: OTOLARYNGOLOGY

## 2024-03-18 RX ORDER — FAMOTIDINE 20 MG/1
20 TABLET, FILM COATED ORAL NIGHTLY
Qty: 30 TABLET | Refills: 2 | Status: SHIPPED | OUTPATIENT
Start: 2024-03-18 | End: 2024-06-14

## 2024-03-18 RX ORDER — AZELASTINE HYDROCHLORIDE, FLUTICASONE PROPIONATE 137; 50 UG/1; UG/1
1 SPRAY, METERED NASAL 2 TIMES DAILY
Qty: 23 G | Refills: 5 | Status: SHIPPED | OUTPATIENT
Start: 2024-03-18

## 2024-03-18 NOTE — PROGRESS NOTES
"Referring Provider:    Self, Aaareferral  No address on file  Subjective:   Patient: Cortney Daniel [id number], :1955   Visit date:3/18/2024 2:12 PM    Chief Complaint:  Other (Congestion )    HPI:    Prior notes reviewed by myself.  Clinical documentation obtained by nursing staff reviewed.     68-year-old gentleman presents for evaluation of nasal congestion.  He primarily notices congestion at night when he is supine.  He states that at times either side may be completely blocked.  He has tried different nasal sprays in the past but does not feel that they were effective and does not like using nasal sprays.  He has not currently using any sinonasal medication consistently.  No history of prior sinonasal surgery or trauma.  No recent history of recurrent acute sinusitis or chronic sinusitis.  He has a history of severe allergies with positive allergy testing.  He has been on allergy shots in the past.  He has a history of reflux and takes pantoprazole daily.  He is still having some throat clearing and sensation of excess postnasal drip, mucus in the back of his throat.        Objective:     Physical Exam:  Vitals:  Wt 72.6 kg (160 lb 0.9 oz)   BMI 25.07 kg/m²   General appearance:  Well developed, well nourished    Ears:  Otoscopy of external auditory canals and tympanic membranes was normal, clinical speech reception thresholds grossly intact, no mass/lesion of auricle.    Nose:  No masses/lesions of external nose, nasal mucosa mildly congested, septum s shaped and deviated 2+ left, and turbinates were hypertrophied 3+.    Mouth:  No mass/lesion of lips, teeth, gums, hard/soft palate, tongue, tonsils, or oropharynx.    Neck & Lymphatics:  No cervical lymphadenopathy, no neck mass/crepitus/ asymmetry, trachea is midline, no thyroid enlargement/tenderness/mass.        [x]  Data Reviewed:     No results found for: "WBC", "HGB", "HCT", "MCV", "LABPLAT", "EOSINOPHIL"    [x]  Independent interpretation of " test:normal PSG, PLM disorder  Respiratory Events  The polysomnogram revealed a presence of 4 obstructive, - central, and 1 mixed apneas resulting in an Apnea index of 1.0 events per hour.  There were 5 hypopneas (?3% desat and/or Ar.) resulting in an Apnea\Hypopnea Index (AHI ?3% desat and/or Ar.) of 2.1 events per hour.  There were 4 hypopneas (?4% desat) resulting in an Apnea\Hypopnea Index (AHI ?4% desat) of 1.9 events per hour.  There were - Respiratory Effort Related Arousals resulting in a RERA index of - events per hour. The Respiratory Disturbance Index is 2.1 events per hour.      Mean oxygen saturation was 93.4%.  The lowest oxygen saturation during sleep was 87.0%.  Time spent ?88% oxygen saturation was 1.3 minutes (0.3%).      Reviewed notes from Dr. Poe and Dr. Bajwa      Assessment & Plan:   Non-seasonal allergic rhinitis, unspecified trigger  -     azelastine-fluticasone (DYMISTA) 137-50 mcg/spray Spry nassal spray; 1 spray by Each Nostril route 2 (two) times daily.  Dispense: 23 g; Refill: 5    Laryngopharyngeal reflux (LPR)  -     famotidine (PEPCID) 20 MG tablet; Take 1 tablet (20 mg total) by mouth every evening.  Dispense: 30 tablet; Refill: 2    PLMD (periodic limb movement disorder)    Snoring    Nasal septal deviation    Hypertrophy of inferior nasal turbinate        We had a long discussion about his history, exam and recent sleep study.  He has primary snoring and significant nasal congestion when supine.  I believe this multifactorial, likely due to his allergic rhinitis, septal deviation and turbinate hypertrophy.  He agreed on a trial of Dymista for at least 6-8 weeks to evaluate for treatment response.  He is also having some symptoms that I feel are consistent with inadequately controlled LPR, so I recommended starting Pepcid at bedtime.  We did discuss potential surgical options including septoplasty and turbinate reduction which we may proceed with depending on his  progress.    Murali Martins M.D.  Department of Otolaryngology - Head & Neck Surgery  [address] [facility].  [place] [address]  P: [phone]  F: [phone]        DISCLAIMER: This note was prepared with [employer] voice recognition transcription software. Garbled syntax, mangled pronouns, and other bizarre constructions may be attributed to that software system. While efforts were made to correct any mistakes made by this voice recognition program, some errors and/or omissions may remain in the note that were missed when the note was originally created.

## 2024-06-14 DIAGNOSIS — K21.9 LARYNGOPHARYNGEAL REFLUX (LPR): ICD-10-CM

## 2024-06-14 RX ORDER — FAMOTIDINE 20 MG/1
20 TABLET, FILM COATED ORAL NIGHTLY
Qty: 30 TABLET | Refills: 2 | Status: SHIPPED | OUTPATIENT
Start: 2024-06-14

## 2024-09-13 DIAGNOSIS — K21.9 LARYNGOPHARYNGEAL REFLUX (LPR): ICD-10-CM

## 2024-09-13 RX ORDER — FAMOTIDINE 20 MG/1
20 TABLET, FILM COATED ORAL NIGHTLY
Qty: 30 TABLET | Refills: 2 | Status: SHIPPED | OUTPATIENT
Start: 2024-09-13

## 2024-11-13 ENCOUNTER — TELEPHONE (OUTPATIENT)
Dept: ALLERGY | Facility: CLINIC | Age: 69
End: 2024-11-13
Payer: MEDICARE

## 2024-11-13 NOTE — TELEPHONE ENCOUNTER
----- Message from Sherine sent at 11/13/2024  2:33 PM CST -----  Contact: [phone]  Type:  Patient Returning Call    Who Called:ANGELES LEYVA [[id number]]  Who Left Message for Patient:  Does the patient know what this is regarding?:want to request a epi pen  Would the patient rather a call back or a response via [facility]? Call back  Best Call Back Number:[phone]  Additional Information: [id number]          [id number] - [place] - [address] AT [place] & [facility]  [address] [place] [address]  Phone: [phone] Fax: [phone]

## 2024-12-05 ENCOUNTER — TELEPHONE (OUTPATIENT)
Dept: ALLERGY | Facility: CLINIC | Age: 69
End: 2024-12-05
Payer: MEDICARE

## 2024-12-05 DIAGNOSIS — J30.89 PERENNIAL ALLERGIC RHINITIS WITH SEASONAL VARIATION: Primary | ICD-10-CM

## 2024-12-05 DIAGNOSIS — J30.2 PERENNIAL ALLERGIC RHINITIS WITH SEASONAL VARIATION: Primary | ICD-10-CM

## 2024-12-05 RX ORDER — EPINEPHRINE 0.3 MG/.3ML
1 INJECTION SUBCUTANEOUS ONCE
Qty: 2 EACH | Refills: 1 | Status: SHIPPED | OUTPATIENT
Start: 2024-12-05 | End: 2024-12-05

## 2024-12-05 NOTE — TELEPHONE ENCOUNTER
----- Message from Med Assistant Bernardo sent at 12/4/2024  4:41 PM CST -----    ----- Message -----  From: Fatimah Portillo  Sent: 12/4/2024   3:39 PM CST  To: Deepika Amaya Staff    Type:  RX Refill Request    Who Called: donny  Refill or New Rx:Refill  RX Name and Strength:Epi Pen  How is the patient currently taking it? (ex. 1XDay):  Is this a 30 day or 90 day RX:  Preferred Pharmacy with phone number:  [id number] - [facility] [place] - [address] AT [place] [facility]  [address] [place] [address]  Phone: [phone] Fax: [phone]     Local or Mail Order:Local  Ordering Provider:Jose Luis Poe  Would the patient rather a call back or a response via [facility]? Callback  Best Call Back Number:[phone]  Additional Information: Need called in

## 2024-12-10 DIAGNOSIS — K21.9 LARYNGOPHARYNGEAL REFLUX (LPR): ICD-10-CM

## 2024-12-10 RX ORDER — FAMOTIDINE 20 MG/1
20 TABLET, FILM COATED ORAL NIGHTLY
Qty: 30 TABLET | Refills: 2 | Status: SHIPPED | OUTPATIENT
Start: 2024-12-10

## 2025-03-08 DIAGNOSIS — K21.9 LARYNGOPHARYNGEAL REFLUX (LPR): ICD-10-CM

## 2025-03-10 RX ORDER — FAMOTIDINE 20 MG/1
20 TABLET, FILM COATED ORAL NIGHTLY
Qty: 30 TABLET | Refills: 2 | Status: SHIPPED | OUTPATIENT
Start: 2025-03-10

## 2025-06-18 DIAGNOSIS — K21.9 LARYNGOPHARYNGEAL REFLUX (LPR): ICD-10-CM

## 2025-06-18 RX ORDER — FAMOTIDINE 20 MG/1
20 TABLET, FILM COATED ORAL NIGHTLY
Qty: 90 TABLET | Refills: 3 | Status: SHIPPED | OUTPATIENT
Start: 2025-06-18